# Patient Record
Sex: MALE | Race: OTHER | Employment: UNEMPLOYED | URBAN - METROPOLITAN AREA
[De-identification: names, ages, dates, MRNs, and addresses within clinical notes are randomized per-mention and may not be internally consistent; named-entity substitution may affect disease eponyms.]

---

## 2019-01-09 ENCOUNTER — DOCUMENTATION ONLY (OUTPATIENT)
Dept: NEUROLOGY | Age: 64
End: 2019-01-09

## 2019-01-09 ENCOUNTER — OFFICE VISIT (OUTPATIENT)
Dept: NEUROLOGY | Age: 64
End: 2019-01-09

## 2019-01-09 VITALS
HEART RATE: 68 BPM | WEIGHT: 161.8 LBS | SYSTOLIC BLOOD PRESSURE: 132 MMHG | DIASTOLIC BLOOD PRESSURE: 78 MMHG | HEIGHT: 67 IN | BODY MASS INDEX: 25.39 KG/M2 | OXYGEN SATURATION: 98 %

## 2019-01-09 DIAGNOSIS — G44.86 CERVICOGENIC HEADACHE: ICD-10-CM

## 2019-01-09 DIAGNOSIS — G45.9 TIA (TRANSIENT ISCHEMIC ATTACK): Primary | ICD-10-CM

## 2019-01-09 RX ORDER — ASPIRIN 325 MG
325 TABLET ORAL DAILY
COMMUNITY

## 2019-01-09 RX ORDER — ATORVASTATIN CALCIUM 10 MG/1
TABLET, FILM COATED ORAL DAILY
COMMUNITY
End: 2019-01-23

## 2019-01-09 NOTE — PROGRESS NOTES
Faxed records request to [facility] on [address]      Also Faxed records request to Dr. Ashwin Bloom for records

## 2019-01-09 NOTE — PROGRESS NOTES
NEUROLOGY CLINIC NOTE Patient ID: 
Jefry Cortez [id number] 
58 y.o. 
1955 Date of Consultation:  January 9, 2019 Referring Physician: Dr. Veronica Milner Reason for Consultation:  Questions of stroke Chief Complaint Patient presents with  New Patient Possible Stroke, difficulty in speech, numb mouth and tongue. also has headaches usually an 5/10 but can get to an 8/10 History of Present Illness: There are no active problems to display for this patient. Past Medical History:  
Diagnosis Date  Cerebral artery occlusion with cerebral infarction ([address]  Essential hypertension Past Surgical History:  
Procedure Laterality Date  HX ORTHOPAEDIC Prior to Admission medications Medication Sig Start Date End Date Taking? Authorizing Provider  
atorvastatin (LIPITOR) 10 mg tablet Take  by mouth daily. Yes Provider, Historical  
aspirin (ASPIRIN) 325 mg tablet Take 325 mg by mouth daily. Yes Provider, Historical  
 
No Known Allergies Social History Tobacco Use  Smoking status: Never Smoker  Smokeless tobacco: Never Used Substance Use Topics  Alcohol use: No  
  Frequency: Never Family History Problem Relation Age of Onset  Cancer Father Subjective:  
  
Jefry Cortez is a 58 y.o. RH male with history of hypercholesterolemia and HTN who was referred here by Dr. Veronica Milner for further evaluation of possible TIA or stroke. Per patient condition started last 12/28/2018 with back of the head pain all day. 6/10, pressure pain. Next day pain on top of the head and poking pain. Then 12/31/2018, he felt his tongue was numb and problems chewing his food. BP was elevated. Wife noticed he was slurring his speech. Patient went to [facility] at [facility] 1/1/2019. Recalls Head CT, labs and EKG done. Told they did not find anything. Advised to see his PCP. Noted his SBP was high in the 170s at home.  Took his wife's Hydrochlorothiazide. He saw Dr. Armando Bell 1/3/2019 and was referred here. I do not have the ER or PCP records. He denies any vision changes, focal face or limb numbness or weakness. No gait issues. Since, then only a mild headache still persist and numbness right side of the tongue. He is taking Aspirin 325 mg daily. Outside reports reviewed: none. Review of Systems: A comprehensive review of systems was performed:  
Constitutional: positive for none Eyes: positive for none Ears, nose, mouth, throat, and face: positive for snoring Respiratory: positive for none Cardiovascular: positive for none Gastrointestinal: positive for none Genitourinary: positive for none Integument/breast: positive for none Hematologic/lymphatic: positive for none Musculoskeletal: positive for none Neurological: positive for headaches, tongue numbness Behavioral/Psych: positive for none Endocrine: positive for none Allergic/Immunologic: positive for none Objective:  
 
Visit Vitals /78 Pulse 68 Ht 5' 7\" (1.702 m) Wt 161 lb 12.8 oz (73.4 kg) SpO2 98% BMI 25.34 kg/m² PHYSICAL EXAM: 
 
General Appearance: Alert, patient appears stated age. General:  Well developed, well nourished patent in no apparent distress. Head/Face: The head is normocephalic and atraumatic. Eyes: Conjunctivae appear normal. Sclera appear normal and non-icteric. Nose (and Sinus):   No abnormality of the nose or sinuses is noted. Oral:   Throat is clear. Lymphatics:  No lymphadenopathy in the neck/head. Neck and Thyroid:   No bruits noted in the neck. Respiratory:  Lungs clear to auscultation. Cardiovascular:  Palpation and auscultation: regular rate and rhythm. Extremity: No joint swelling, erythema or pedal edema. Old surgical scars left upper arm and forearm. NEUROLOGICAL EXAM: 
 
Appearance: The patient is well developed, well nourished, provides a coherent history and is in no acute distress. Mental Status: Oriented to time, place and person. Fluent, no aphasia or dysarthria. Mood and affect appropriate. Cranial Nerves:   Intact visual fields. CHRISTEN, EOM's full, no nystagmus, no ptosis. Facial sensation is normal. Corneal reflexes are intact. Facial movement is symmetric. Hearing is normal bilaterally. Palate is midline with normal elevation. Sternocleidomastoid and trapezius muscles are normal. Tongue is midline. Motor:  5/5 strength in upper and lower proximal and distal muscles. Normal bulk and tone. No fasciculations. No pronator drift. Reflexes:   Deep tendon reflexes 2+/4 and symmetrical. Downgoing toes. Sensory:   Normal to cold, pinprick and vibration. Gait:  Normal gait. No Romberg. Can do tandem walking. Tremor:   No tremor noted. Cerebellar:  Intact FTN/ISAIAS/HTS. Neurovascular:  Normal heart sounds and regular rhythm, peripheral pulses intact, and no carotid bruits. Anterior head posture (2 FB) with shoulders rotated in 
(+) tenderness bilateral occiput Assessment:  
TIA Cervicogenic headache Plan: 1. Neurological examination is non-focal. No residual deficits seen to correlate with subjective complaint. Consider TIA. I need to obtain records from [address] [place] [address] ER and PCP. Head CT was reported to be negative. Brain MRI without contrast was ordered to assess for subtle stroke given persistence of tongue numbness. Need to do stroke risk stratification workup. Carotid doppler was ordered to assess for ICA stenosis. Echocardiogram with bubble study was ordered to assess for thrombus and PFO. See when PCP note is obtained if LDL has been checked. Needs to be <70. Continue Aspirin 325 mg daily for stroke prophylaxis. Discussed with the patient potential etiology, stroke risk stratification workup and treatment plan. Advised to call 911 if new deficits occur. [place] he cannot drive for 3 months from his TIA. Exam also reveals elements of cervicogenic headaches due to poor anterior head posture. Patient was advised to correct his head posture. Use headrest at work, at home and while driving. Use wireless headset. Do not carry anything on the shoulder. Use only one at most pillow at night when sleeping. Okay to take over-the-counter medication for relief of his headaches. Condition worsens then patient will be sent to physical therapy for more aggressive manipulation and trial medications will be done. All questions and concerns were answered. Visit lasted 60 minutes. Greater than 50% was spent discussing his condition, potential etiology, prognosis, likely had a TIA but need to assess for subtle stroke, need for brain MRI, carotid Doppler, echocardiogram and bubble study, lipid testing, stroke prevention and prophylaxis, DMV regulation regarding driving, posture changes and neck and shoulder exercises, treatment options, medication

## 2019-01-09 NOTE — PATIENT INSTRUCTIONS
Transient Ischemic Attack: Care Instructions Your Care Instructions A transient ischemic attack (TIA) is when blood flow to a part of your brain is blocked for a short time. A TIA is like a stroke but usually lasts only a few minutes. A TIA does not cause lasting brain damage. Any vision problems, slurred speech, or other symptoms usually go away in 10 to 20 minutes. But they may last for up to 24 hours. TIAs are often warning signs of a stroke. Some people who have a TIA may have a stroke in the future. A stroke can cause symptoms like those of a TIA. But a stroke causes lasting damage to your brain. You can take steps to help prevent a stroke. One thing you can do is get early treatment. If you have other new symptoms, or if your symptoms do not get better, go back to the emergency room or call your doctor right away. Getting treatment right away may prevent long-term brain damage caused by a stroke. The doctor has checked you carefully, but problems can develop later. If you notice any problems or new symptoms, get medical treatment right away. Follow-up care is a key part of your treatment and safety. Be sure to make and go to all appointments, and call your doctor if you are having problems. It's also a good idea to know your test results and keep a list of the medicines you take. How can you care for yourself at home? Medicines 
  · Be safe with medicines. Take your medicines exactly as prescribed. Call your doctor if you think you are having a problem with your medicine.  
  · If you take a blood thinner, such as aspirin, be sure you get instructions about how to take your medicine safely.  Blood thinners can cause serious bleeding problems.  
  · Call your doctor if you are not able to take your medicines for any reason.  
  · Do not take any over-the-counter medicines or herbal products without talking to your doctor first.  
  · If you take birth control pills or hormone therapy, talk to your doctor. Ask if these treatments are right for you.  
 Lifestyle changes 
  · Do not smoke. If you need help quitting, talk to your doctor about stop-smoking programs and medicines.  
  · Be active. If your doctor recommends it, get more exercise. Walking is a good choice. Bit by bit, increase the amount you walk every day. Try for at least 30 minutes on most days of the week. You also may want to swim, bike, or do other activities.  
  · Eat heart-healthy foods. These include fruits, vegetables, high-fiber foods, fish, and foods that are low in sodium, saturated fat, and trans fat.  
  · Stay at a healthy weight. Lose weight if you need to.  
  · Limit alcohol to 2 drinks a day for men and 1 drink a day for women.  
 Staying healthy 
  · Manage other health problems such as diabetes, high blood pressure, and high cholesterol.  
  · Get the flu vaccine every year. When should you call for help? Call 911 anytime you think you may need emergency care. For example, call if: 
  · You have new or worse symptoms of a stroke. These may include: 
? Sudden numbness, tingling, weakness, or loss of movement in your face, arm, or leg, especially on only one side of your body. ? Sudden vision changes. ? Sudden trouble speaking. ? Sudden confusion or trouble understanding simple statements. ? Sudden problems with walking or balance. ? A sudden, severe headache that is different from past headaches. Call 911 even if these symptoms go away in a few minutes.  
  · You feel like you are having another TIA.  
 Watch closely for changes in your health, and be sure to contact your doctor if you have any problems. Where can you learn more? Go to http://mikel-jose.info/. Enter [phone] in the search box to learn more about \"Transient Ischemic Attack: Care Instructions. \" Current as of: November 21, 2017 Content Version: 11.8 © 3915-2530 Healthwise, Incorporated.  Care instructions adapted under license by [address] (which disclaims liability or warranty for this information). If you have questions about a medical condition or this instruction, always ask your healthcare professional. [address] any warranty or liability for your use of this information. Headache: Care Instructions Your Care Instructions Headaches have many possible causes. Most headaches aren't a sign of a more serious problem, and they will get better on their own. Home treatment may help you feel better faster. The doctor has checked you carefully, but problems can develop later. If you notice any problems or new symptoms, get medical treatment right away. Follow-up care is a key part of your treatment and safety. Be sure to make and go to all appointments, and call your doctor if you are having problems. It's also a good idea to know your test results and keep a list of the medicines you take. How can you care for yourself at home? · Do not drive if you have taken a prescription pain medicine. · Rest in a quiet, dark room until your headache is gone. Close your eyes and try to relax or go to sleep. Don't watch TV or read. · Put a cold, moist cloth or cold pack on the painful area for 10 to 20 minutes at a time. Put a thin cloth between the cold pack and your skin. · Use a warm, moist towel or a heating pad set on low to relax tight shoulder and neck muscles. · Have someone gently massage your neck and shoulders. · Take pain medicines exactly as directed. ? If the doctor gave you a prescription medicine for pain, take it as prescribed. ? If you are not taking a prescription pain medicine, ask your doctor if you can take an over-the-counter medicine. · Be careful not to take pain medicine more often than the instructions allow, because you may get worse or more frequent headaches when the medicine wears off. · Do not ignore new symptoms that occur with a headache, such as a fever, weakness or numbness, vision changes, or confusion. These may be signs of a more serious problem. To prevent headaches · Keep a headache diary so you can figure out what triggers your headaches. Avoiding triggers may help you prevent headaches. Record when each headache began, how long it lasted, and what the pain was like (throbbing, aching, stabbing, or dull). Write down any other symptoms you had with the headache, such as nausea, flashing lights or dark spots, or sensitivity to bright light or loud noise. Note if the headache occurred near your period. List anything that might have triggered the headache, such as certain foods (chocolate, cheese, wine) or odors, smoke, bright light, stress, or lack of sleep. · Find healthy ways to deal with stress. Headaches are most common during or right after stressful times. Take time to relax before and after you do something that has caused a headache in the past. 
· Try to keep your muscles relaxed by keeping good posture. Check your jaw, face, neck, and shoulder muscles for tension, and try relaxing them. When sitting at a desk, change positions often, and stretch for 30 seconds each hour. · Get plenty of sleep and exercise. · Eat regularly and well. Long periods without food can trigger a headache. · Treat yourself to a massage. Some people find that regular massages are very helpful in relieving tension. · Limit caffeine by not drinking too much coffee, tea, or soda. But don't quit caffeine suddenly, because that can also give you headaches. · Reduce eyestrain from computers by blinking frequently and looking away from the computer screen every so often. Make sure you have proper eyewear and that your monitor is set up properly, about an arm's length away. · Seek help if you have depression or anxiety.  Your headaches may be linked to these conditions. Treatment can both prevent headaches and help with symptoms of anxiety or depression. When should you call for help? Call 911 anytime you think you may need emergency care. For example, call if: 
  · You have signs of a stroke. These may include: 
? Sudden numbness, paralysis, or weakness in your face, arm, or leg, especially on only one side of your body. ? Sudden vision changes. ? Sudden trouble speaking. ? Sudden confusion or trouble understanding simple statements. ? Sudden problems with walking or balance. ? A sudden, severe headache that is different from past headaches.  
 Call your doctor now or seek immediate medical care if: 
  · You have a new or worse headache.  
  · Your headache gets much worse. Where can you learn more? Go to http://mikel-jose.info/. Enter M271 in the search box to learn more about \"Headache: Care Instructions. \" Current as of: June 4, 2018 Content Version: 11.8 © 6548-6243 [employer]. Care instructions adapted under license by [employer] (which disclaims liability or warranty for this information). If you have questions about a medical condition or this instruction, always ask your healthcare professional. [place] [address] any warranty or liability for your use of this information. Learning About How to Prevent a Stroke What is a stroke? A stroke occurs when a blood vessel in the brain bursts or is blocked by a blood clot. Without blood and the oxygen it carries, part of the brain starts to die. The part of the body controlled by the damaged area of the brain can't work properly. But there are many things you can do to help lower your stroke risk. What increases your risk for stroke? A risk factor is anything that makes you more likely to have a particular health problem. Risk factors for stroke that you can treat or change include: · Health problems like high blood pressure, atrial fibrillation, diabetes, and high cholesterol. · Smoking. · Heavy use of alcohol. · Being overweight. · Not getting enough physical activity. Risk factors you can't change include: · Age. The risk of stroke goes up as you get older. · Race.  Americans, Native Americans, and [place] Natives have a higher risk than those of other races. · Being female. Women have a higher risk of stroke than men. · Family history of stroke. Your doctor can help you know your risk. Then you and your can doctor talk about whether you need to lower it. What can you do to prevent a stroke? · Treat any health problems you have that raise your risk. · Adopt a heart-healthy lifestyle: ? Don't smoke. If you need help quitting, talk to your doctor about stop-smoking programs and medicines. These can increase your chances of quitting for good. ? Limit alcohol to 2 drinks a day for men and 1 drink a day for women. ? Stay at a healthy weight. Lose weight if you need to. 
? If your doctor recommends it, get more exercise. Walking is a good choice. Bit by bit, increase the amount you walk every day. Try for at least 30 minutes on most days of the week. ? Eat heart-healthy foods. These include fruits, vegetables, high-fiber foods, and fish. Choose foods that are low in sodium, saturated fat, and trans fat. · Decide with your doctor whether you will also take medicines to help lower your risk. For example, you and your doctor may decide you will take a medicine that prevents blood clots. What are the symptoms of a stroke? The brain damage from a stroke starts within minutes. That's why it's so important to know the symptoms of stroke and to act fast. Quick treatment can help limit damage to the brain so that you have fewer problems after the stroke. FAST is a simple way to remember the main symptoms of stroke.  Recognizing these symptoms helps you know when to call for medical help. FAST stands for: 
· Face drooping. · Arm weakness. · Speech difficulty. · Time to call 911. Follow-up care is a key part of your treatment and safety. Be sure to make and go to all appointments, and call your doctor if you are having problems. It's also a good idea to know your test results and keep a list of the medicines you take. Where can you learn more? Go to http://mikel-jose.info/. Enter G757 in the search box to learn more about \"Learning About How to Prevent a Stroke. \" Current as of: November 21, 2017 Content Version: 11.8 © 7362-9724 Healthwise, Incorporated. Care instructions adapted under license by [employer] (which disclaims liability or warranty for this information). If you have questions about a medical condition or this instruction, always ask your healthcare professional. [address] any warranty or liability for your use of this information.

## 2019-01-11 ENCOUNTER — TELEPHONE (OUTPATIENT)
Dept: NEUROLOGY | Age: 64
End: 2019-01-11

## 2019-01-11 NOTE — TELEPHONE ENCOUNTER
Emailed our Neuro authorization team at [facility]:     Fanchimp,     I received a voice mail from [employer] regarding this patient but no information about what the case was approving. Im wondering if it is for the MRI Brain order. Case [id number] - vm stated it was approved. Any questions, call [phone]. Can you check on this and let us know if it was for the MRI?

## 2019-01-17 ENCOUNTER — HOSPITAL ENCOUNTER (OUTPATIENT)
Dept: VASCULAR SURGERY | Age: 64
Discharge: HOME | End: 2019-01-17
Attending: PSYCHIATRY & NEUROLOGY
Payer: COMMERCIAL

## 2019-01-17 ENCOUNTER — HOSPITAL ENCOUNTER (OUTPATIENT)
Dept: NON INVASIVE DIAGNOSTICS | Age: 64
Discharge: HOME | End: 2019-01-17
Attending: PSYCHIATRY & NEUROLOGY
Payer: COMMERCIAL

## 2019-01-17 DIAGNOSIS — G45.9 TIA (TRANSIENT ISCHEMIC ATTACK): ICD-10-CM

## 2019-01-17 PROCEDURE — 93880 EXTRACRANIAL BILAT STUDY: CPT

## 2019-01-17 PROCEDURE — 96374 THER/PROPH/DIAG INJ IV PUSH: CPT

## 2019-01-17 NOTE — PROCEDURES
[facility]  *** FINAL REPORT ***    Name: Seun Thompson  MRN: [id number]    Outpatient  : 01 Sep 1955  HIS Order #: [id number]  [id number] [facility] Visit #: [id number]  Date: 2019    TYPE OF TEST: Cerebrovascular Duplex    REASON FOR TEST  TIA with transient neurological disturbance. Right Carotid:-             Proximal               Mid                 Distal  cm/s  Systolic  Diastolic  Systolic  Diastolic  Systolic  Diastolic  CCA:     37.3      13.3                            83.7      25.5  Bulb:  ECA:     93.0      20.2  ICA:     45.1      20.9       40.1      19.4       57.2      25.8  ICA/CCA:  0.5       0.8    ICA Stenosis:    Right Vertebral:-  Finding: Antegrade  Sys:       37.4  [place]:       15.4    Right Subclavian:    Left Carotid:-            Proximal                Mid                 Distal  cm/s  Systolic  Diastolic  Systolic  Diastolic  Systolic  Diastolic  CCA:    271.5      28.9                            90.4      25.6  Bulb:  ECA:     99.6      21.6  ICA:     60.5      23.8       67.4      29.0       48.4      19.0  ICA/CCA:  0.7       0.9    ICA Stenosis:    Left Vertebral:-  Finding: Antegrade  Sys:       39.1  [place]:       11.7    Left Subclavian:    INTERPRETATION/FINDINGS  PROCEDURE:  Color duplex ultrasound imaging of extracranial  cerebrovascular arteries. FINDINGS:       Right:  Internal carotid velocity is within normal limits, there  is no observed narrowing of the flow channel on color Doppler imaging,   and no plaque is visualized on B-mode imaging. The common and  external carotid arteries are patent and without evidence of  hemodynamically significant stenosis. Left:  Internal carotid velocity is within normal limits, there  is no observed narrowing of the flow channel on color Doppler imaging,   and no plaque is visualized on B-mode imaging.   The common and  external carotid arteries are patent and without evidence of  hemodynamically significant stenosis. IMPRESSION:  Consistent with no stenosis of the right internal carotid   and no stenosis of the left internal carotid. Vertebrals are patent  with antegrade flow. ADDITIONAL COMMENTS    I have personally reviewed the data relevant to the interpretation of  this  study.     TECHNOLOGIST: Maame He  Signed: 01/17/2019 04:05 PM    PHYSICIAN: Gabriela Natarajan MD  Signed: 01/18/2019 06:48 AM

## 2019-01-18 ENCOUNTER — HOSPITAL ENCOUNTER (OUTPATIENT)
Dept: MRI IMAGING | Age: 64
Discharge: HOME | End: 2019-01-18
Attending: PSYCHIATRY & NEUROLOGY
Payer: COMMERCIAL

## 2019-01-18 DIAGNOSIS — G45.9 TIA (TRANSIENT ISCHEMIC ATTACK): ICD-10-CM

## 2019-01-18 PROCEDURE — 70551 MRI BRAIN STEM W/O DYE: CPT

## 2019-01-23 ENCOUNTER — OFFICE VISIT (OUTPATIENT)
Dept: NEUROLOGY | Age: 64
End: 2019-01-23

## 2019-01-23 VITALS
WEIGHT: 162.4 LBS | DIASTOLIC BLOOD PRESSURE: 88 MMHG | HEART RATE: 75 BPM | OXYGEN SATURATION: 97 % | HEIGHT: 67 IN | BODY MASS INDEX: 25.49 KG/M2 | SYSTOLIC BLOOD PRESSURE: 118 MMHG

## 2019-01-23 DIAGNOSIS — G45.9 TIA (TRANSIENT ISCHEMIC ATTACK): Primary | ICD-10-CM

## 2019-01-23 DIAGNOSIS — G44.86 CERVICOGENIC HEADACHE: ICD-10-CM

## 2019-01-23 RX ORDER — LISINOPRIL 10 MG/1
TABLET ORAL DAILY
COMMUNITY

## 2019-01-23 RX ORDER — ROSUVASTATIN CALCIUM 5 MG/1
TABLET, COATED ORAL
COMMUNITY

## 2019-01-23 NOTE — PATIENT INSTRUCTIONS
Headache: Care Instructions Your Care Instructions Headaches have many possible causes. Most headaches aren't a sign of a more serious problem, and they will get better on their own. Home treatment may help you feel better faster. The doctor has checked you carefully, but problems can develop later. If you notice any problems or new symptoms, get medical treatment right away. Follow-up care is a key part of your treatment and safety. Be sure to make and go to all appointments, and call your doctor if you are having problems. It's also a good idea to know your test results and keep a list of the medicines you take. How can you care for yourself at home? · Do not drive if you have taken a prescription pain medicine. · Rest in a quiet, dark room until your headache is gone. Close your eyes and try to relax or go to sleep. Don't watch TV or read. · Put a cold, moist cloth or cold pack on the painful area for 10 to 20 minutes at a time. Put a thin cloth between the cold pack and your skin. · Use a warm, moist towel or a heating pad set on low to relax tight shoulder and neck muscles. · Have someone gently massage your neck and shoulders. · Take pain medicines exactly as directed. ? If the doctor gave you a prescription medicine for pain, take it as prescribed. ? If you are not taking a prescription pain medicine, ask your doctor if you can take an over-the-counter medicine. · Be careful not to take pain medicine more often than the instructions allow, because you may get worse or more frequent headaches when the medicine wears off. · Do not ignore new symptoms that occur with a headache, such as a fever, weakness or numbness, vision changes, or confusion. These may be signs of a more serious problem. To prevent headaches · Keep a headache diary so you can figure out what triggers your headaches. Avoiding triggers may help you prevent headaches.  Record when each headache began, how long it lasted, and what the pain was like (throbbing, aching, stabbing, or dull). Write down any other symptoms you had with the headache, such as nausea, flashing lights or dark spots, or sensitivity to bright light or loud noise. Note if the headache occurred near your period. List anything that might have triggered the headache, such as certain foods (chocolate, cheese, wine) or odors, smoke, bright light, stress, or lack of sleep. · Find healthy ways to deal with stress. Headaches are most common during or right after stressful times. Take time to relax before and after you do something that has caused a headache in the past. 
· Try to keep your muscles relaxed by keeping good posture. Check your jaw, face, neck, and shoulder muscles for tension, and try relaxing them. When sitting at a desk, change positions often, and stretch for 30 seconds each hour. · Get plenty of sleep and exercise. · Eat regularly and well. Long periods without food can trigger a headache. · Treat yourself to a massage. Some people find that regular massages are very helpful in relieving tension. · Limit caffeine by not drinking too much coffee, tea, or soda. But don't quit caffeine suddenly, because that can also give you headaches. · Reduce eyestrain from computers by blinking frequently and looking away from the computer screen every so often. Make sure you have proper eyewear and that your monitor is set up properly, about an arm's length away. · Seek help if you have depression or anxiety. Your headaches may be linked to these conditions. Treatment can both prevent headaches and help with symptoms of anxiety or depression. When should you call for help? Call 911 anytime you think you may need emergency care. For example, call if: 
  · You have signs of a stroke. These may include: 
? Sudden numbness, paralysis, or weakness in your face, arm, or leg, especially on only one side of your body. ? Sudden vision changes. ? Sudden trouble speaking. ? Sudden confusion or trouble understanding simple statements. ? Sudden problems with walking or balance. ? A sudden, severe headache that is different from past headaches.  
 Call your doctor now or seek immediate medical care if: 
  · You have a new or worse headache.  
  · Your headache gets much worse. Where can you learn more? Go to http://mikel-jose.info/. Enter M271 in the search box to learn more about \"Headache: Care Instructions. \" Current as of: Amanda 3, 2018 Content Version: 11.9 © 5497-8043 [employer]. Care instructions adapted under license by [employer] (which disclaims liability or warranty for this information). If you have questions about a medical condition or this instruction, always ask your healthcare professional. [place] [address] any warranty or liability for your use of this information. Learning About How to Prevent a Stroke What is a stroke? A stroke occurs when a blood vessel in the brain bursts or is blocked by a blood clot. Without blood and the oxygen it carries, part of the brain starts to die. The part of the body controlled by the damaged area of the brain can't work properly. But there are many things you can do to help lower your stroke risk. What increases your risk for stroke? A risk factor is anything that makes you more likely to have a particular health problem. Risk factors for stroke that you can treat or change include: 
· Health problems like high blood pressure, atrial fibrillation, diabetes, and high cholesterol. · Smoking. · Heavy use of alcohol. · Being overweight. · Not getting enough physical activity. Risk factors you can't change include: · Age. The risk of stroke goes up as you get older. · Race.  Americans, Native Americans, and [place] Natives have a higher risk than those of other races. · Being female. Women have a higher risk of stroke than men. · Family history of stroke. Your doctor can help you know your risk. Then you and your can doctor talk about whether you need to lower it. What can you do to prevent a stroke? · Treat any health problems you have that raise your risk. · Adopt a heart-healthy lifestyle: ? Don't smoke. If you need help quitting, talk to your doctor about stop-smoking programs and medicines. These can increase your chances of quitting for good. ? Limit alcohol to 2 drinks a day for men and 1 drink a day for women. ? Stay at a healthy weight. Lose weight if you need to. 
? If your doctor recommends it, get more exercise. Walking is a good choice. Bit by bit, increase the amount you walk every day. Try for at least 30 minutes on most days of the week. ? Eat heart-healthy foods. These include fruits, vegetables, high-fiber foods, and fish. Choose foods that are low in sodium, saturated fat, and trans fat. · Decide with your doctor whether you will also take medicines to help lower your risk. For example, you and your doctor may decide you will take a medicine that prevents blood clots. What are the symptoms of a stroke? The brain damage from a stroke starts within minutes. That's why it's so important to know the symptoms of stroke and to act fast. Quick treatment can help limit damage to the brain so that you have fewer problems after the stroke. FAST is a simple way to remember the main symptoms of stroke. Recognizing these symptoms helps you know when to call for medical help. FAST stands for: 
· Face drooping. · Arm weakness. · Speech difficulty. · Time to call 911. Follow-up care is a key part of your treatment and safety. Be sure to make and go to all appointments, and call your doctor if you are having problems. It's also a good idea to know your test results and keep a list of the medicines you take. Where can you learn more? Go to http://mikel-jose.info/. Enter G757 in the search box to learn more about \"Learning About How to Prevent a Stroke. \" Current as of: September 26, 2018 Content Version: 11.9 © 3034-7658 [employer]. Care instructions adapted under license by [employer] (which disclaims liability or warranty for this information). If you have questions about a medical condition or this instruction, always ask your healthcare professional. [address] any warranty or liability for your use of this information. Learning About How to Prevent Another Stroke What can you do to prevent another stroke? After a stroke, people feel lots of different emotions. Some people are worried that they could have another stroke. Or they may feel overwhelmed by how much there is to learn and do. Some people feel sad or depressed. No matter what emotions you are feeling, you can give yourself some control and peace of mind by making a plan to lower your risk of having another stroke. Take your medicines You'll need to take medicines to help prevent another stroke. Be sure to take your medicines exactly as prescribed. And don't stop taking them unless your doctor tells you to. If you stop taking your medicines, you can increase your risk of having another stroke. Some of the medicines your doctor may prescribe include: · Aspirin or some other blood thinner to prevent blood clots. · Statins to lower cholesterol. · Blood pressure medicines to lower blood pressure. Manage other health problems You can help lower your chance of having another stroke by managing certain other health problems. Problems that increase your risk of having another stroke include: · High blood pressure. · High cholesterol. · Atrial fibrillation. · Diabetes. If you have any of these health problems, you can manage them with healthy lifestyle changes along with medicine. Adopt a healthy lifestyle · Do not smoke or allow others to smoke around you. If you need help quitting, talk to your doctor about stop-smoking programs and medicines. These can increase your chances of quitting for good. Smoking makes a stroke more likely. · Limit alcohol to 2 drinks a day for men and 1 drink a day for women. · Lose weight if you need to. Controlling your weight will help you keep your heart and body healthy. · Be active. Ask your doctor what type and level of activity is safe for you. · Eat heart-healthy foods, like fruits, vegetables, and high-fiber foods. It's also important to: · Get a flu shot every year. · Ask for help if you think you are depressed. Do stroke rehab Taking part in a stroke rehabilitation (rehab) program will help you to regain skills you lost or make the most of your abilities after a stroke. It also helps you take steps to prevent another stroke. Your rehab team will give you education and support to help you build new, healthy habits. You'll learn how to manage any other health problems that you might have. Constanza Zurita also learn how to exercise safely, eat a healthy diet, and quit smoking if you smoke. Constanza Zurita work with your team to decide what lifestyle choices are best for you. If your doctor hasn't already suggested it, ask him or her if stroke rehab is right for you. Know stroke symptoms Make sure you know the symptoms of stroke. FAST is a simple way to remember. Recognizing these symptoms helps you know when to call for medical help. FAST stands for: 
· Face drooping. · Arm weakness. · Speech difficulty. · Time to call 911. Follow-up care is a key part of your treatment and safety. Be sure to make and go to all appointments, and call your doctor if you are having problems. It's also a good idea to know your test results and keep a list of the medicines you take. Where can you learn more? Go to http://mikel-jose.info/. Enter N563 in the search box to learn more about \"Learning About How to Prevent Another Stroke. \" Current as of: September 26, 2018 Content Version: 11.9 © 3670-1341 [employer]. Care instructions adapted under license by [employer] (which disclaims liability or warranty for this information). If you have questions about a medical condition or this instruction, always ask your healthcare professional. [place] [address] any warranty or liability for your use of this information.

## 2019-01-23 NOTE — PROGRESS NOTES
NEUROLOGY CLINIC NOTE Patient ID: 
Stephan Regan [id number] 
58 y.o. 
1955 Date of Visit:  January 23, 2019 Referring Physician: Dr. Anne Dukes Reason for Visit: TIA Chief Complaint Patient presents with  Follow-up  
  results of testing History of Present Illness: There are no active problems to display for this patient. Past Medical History:  
Diagnosis Date  Cerebral artery occlusion with cerebral infarction ([address]  Essential hypertension Past Surgical History:  
Procedure Laterality Date  HX ORTHOPAEDIC Prior to Admission medications Medication Sig Start Date End Date Taking? Authorizing Provider  
lisinopril (PRINIVIL, ZESTRIL) 10 mg tablet Take  by mouth daily. Yes Provider, Historical  
rosuvastatin (CRESTOR) 5 mg tablet Take  by mouth nightly. Yes Provider, Historical  
aspirin (ASPIRIN) 325 mg tablet Take 325 mg by mouth daily. Yes Provider, Historical  
atorvastatin (LIPITOR) 10 mg tablet Take  by mouth daily. Provider, Historical  
 
No Known Allergies Social History Tobacco Use  Smoking status: Never Smoker  Smokeless tobacco: Never Used Substance Use Topics  Alcohol use: No  
  Frequency: Never Family History Problem Relation Age of Onset  Cancer Father Subjective:  
  
Stephan Regan is a 58 y.o. RH male with history of hypercholesterolemia and HTN who was referred here by Dr. Anne Dukes for further evaluation of possible TIA or stroke. Per patient condition started last 12/28/2018 with back of the head pain all day. 6/10, pressure pain. Next day pain on top of the head and poking pain. Then 12/31/2018, he felt his tongue was numb and problems chewing his food. BP was elevated. Wife noticed he was slurring his speech. Patient went to [facility] at [facility] ER 1/1/2019. Recalls Head CT, labs and EKG done. Told they did not find anything.  Advised to see his PCP. Noted his SBP was high in the 170s at home. Took his wife's Hydrochlorothiazide. He saw Dr. Armen Scott 1/3/2019 and was referred here. I do not have the ER or PCP records. He denies any vision changes, focal face or limb numbness or weakness. No gait issues. Since, then only a mild headache still persist and numbness right side of the tongue. He is taking Aspirin 325 mg daily. Records received from [address] [place] [address] ER and PCP were reviewed and revealed: 
Patient was seen at [address] [place] [address] ER last 1/1/2019 for 3-day history of mild headache and numbness around the mouth and slow speech. Initial blood pressure was 174/87. No deficits were seen. Laboratory workup revealed elevated creatinine at 1.4 and blood sugar at 136. Unremarkable CBC. Head CT without contrast revealed no acute process. Prominent perivascular space noted in the left basal ganglia. Arachnoid cyst noted in the left anterior middle cranial fossa measuring 3.7 cm x 1.5 cm x 1.8 cm. EKG revealed normal sinus rhythm. Chest x-ray was negative. Seen by Dr. Eliz Agustin (neurology) with NIH stroke scale of 0. Did not think it was TIA or stroke. Could be related to elevated blood pressure. Patient was advised to start 81 mg aspirin daily. Follow-up with PCP. Records obtained from PCP mainly was results for laboratory workup done 1/2/2019. CMP was unremarkable. Since the last visit on 1/9/2019, patient underwent a carotid Doppler 1/17/2019 which revealed no stenosis of bilateral internal carotid arteries. Vertebrals are patent. Echocardiogram with bubble study done 1/17/2019 revealed EF of 55-60%. No PFO seen. No thrombus. Brain MRI without contrast done 1/18/2018 was normal.  Incidental finding of prominent perivascular space left inferior basal ganglia and left anterior medial cranial fossa arachnoid cyst.  Patient reports no recurrence of the problems with his speech.   Still has some mild tongue numbness and mild headache. Patient has been started on lisinopril 10 mg daily and blood pressure has been in good control. He continues to take aspirin 325 mg daily. No new complaint. Outside reports reviewed: radiology report, carotid doppler, echocardiogram, [facility] ER records, labs Review of Systems: A comprehensive review of systems was performed:  
Neurological: positive for headaches, tongue numbness Objective:  
 
Visit Vitals /88 Pulse 75 Ht 5' 7\" (1.702 m) Wt 162 lb 6.4 oz (73.7 kg) SpO2 97% BMI 25.44 kg/m² PHYSICAL EXAM: 
 
NEUROLOGICAL EXAM: 
 
Appearance: The patient is well developed, well nourished, provides a coherent history and is in no acute distress. Mental Status: Oriented to time, place and person. Fluent, no aphasia or dysarthria. Mood and affect appropriate. Cranial Nerves:   II - XII were intact. Motor:  5/5 strength. Normal bulk and tone. No pronator drift. Reflexes:   Deep tendon reflexes 2+/4 and symmetrical. Downgoing toes. Sensory:   Normal to cold, pinprick and vibration. Gait:  Normal gait. No Romberg. Tremor:   No tremor noted. Cerebellar:  Intact FTN/ISAIAS/HTS. Anterior head posture (2 FB) with shoulders rotated in 
(+) tenderness bilateral occiput Assessment:  
TIA Cervicogenic headache Plan:  
 
Neurological examination is still non-focal. No residual deficits seen to correlate with subjective complaint. Consider TIA. Records from [address] [place] [address] ER and PCP (labs) were obtained and reviewed. Head CT without contrast was negative. Brain MRI without contrast was normal. Films reviewed with the patient. Revealed incidental findings of arachnoid cyst and prominent perivascular space left basal ganglia. Carotid doppler did not reveal any ICA stenosis. Echocardiogram with bubble study was unremarkable. No thrombus and PFO. No LDL has been checked. Advised lipid profile check c/o PCP and needs to be <70.  Change Aspirin to 81 mg daily for stroke prophylaxis. Discussed with the patient potential etiology, stroke risk stratification workup and treatment plan. Advised to call 911 if new deficits occur. [place] he cannot drive for 3 months from his TIA. No changes. Exam also reveals elements of cervicogenic headaches due to poor anterior head posture. Patient was advised to correct his head posture. Use headrest at work, at home and while driving. Use wireless headset. Do not carry anything on the shoulder. Use only one at most pillow at night when sleeping. Okay to take over-the-counter medication for relief of his headaches. Condition worsens then patient will be sent to physical therapy for more aggressive manipulation and trial medications will be done. All questions and concerns were answered. Visit lasted 40 minutes. Greater than 50% was spent reviewing his records from [facility] ER and labs from PCP as summarized above, discussing his condition, etiology, prognosis, results of echocardiogram and carotid doppler, review of actual brain MRI films with the patient, lipid testing, stroke prevention and prophylaxis, DMV regulation regarding driving, posture changes and neck and shoulder exercises, treatment options, medication